# Patient Record
(demographics unavailable — no encounter records)

---

## 2025-02-21 NOTE — HISTORY OF PRESENT ILLNESS
[de-identified] : 44-year-old male presents for evaluation of chronic left knee pain. Denies recent trauma or injury, reports mis stepping while working on a roof 5 months ago which caused the pain to worsen. He complains of intermittent pain in the left knee worse with prolonged walking and bending. He has tried topical pain relievers with some relief. He localized the pain to the anterior aspect and posterior lateral aspect of the knee. He notes locking and swelling as well. Denies prior left knee injuries. Of note he has a history of right knee PLM in 2014.  The patient's past medical history, past surgical history, medications and allergies were reviewed by me today with the patient and documented accordingly. In addition, the patient's family and social history, which were noncontributory to this visit were reviewed also.

## 2025-02-21 NOTE — PHYSICAL EXAM
[de-identified] : Left knee exam  Skin: Clean, dry, intact Inspection: No obvious malalignment, +ganglion cyst over the LJL, no swelling, no effusion Pulses: 2+ DP/PT pulses ROM: 0-135 degrees of flexion. + pain with deep knee flexion/extension. Tenderness: No MJLT. + LJLT. No pain over the patella facets. No pain to the quadriceps tendon. No pain to the patella tendon. No posterior knee tenderness. Stability: Stable to varus, valgus. Negative lachman testing. Negative anterior drawer, negative posterior drawer. Strength: 5/5 Q/H/TA/GS/EHL, without atrophy Neuro: In tact to light touch throughout, DTR's normal Additional tests: Negative McMurrays test, Negative patellar grind test.  [de-identified] : The following radiographs were ordered and read by me during this patients visit. I reviewed each radiograph in detail with the patient and discussed the findings as highlighted below.   4 views of the left knee were obtained, 02/20/2025, that show no acute fracture or dislocation. There is mild medial, mild lateral and moderate patellofemoral degenerative changes seen. There is no significant malalignment. Patella angus

## 2025-02-21 NOTE — DISCUSSION/SUMMARY
[de-identified] : 43 y/o male with left knee pain.  Patient presents for evaluation of left knee pain. The patient's symptoms are consistent with possible meniscal pathology through the lateral compartment of the knee with pain with deep flexion as well as joint line tenderness with a cyst over the joint line. Patient reports mechanical symptoms of swelling and locking as well as that the pain feels similar to the right knee meniscus tear. Discussed with the patient in detail the concern for underlying internal derangement to the meniscus and possible treatment options that may include conservative management (e.g. RICE, activity modification, PT, injection therapy) versus operative arthroscopy. Discussed that treatment would likely depend on the nature of the injury, quality of the chondral surfaces (degree of arthrosis) as seen on MRI imaging.   Recommendation: Rest, ice, compression, elevation (RICE) and OTC NSAID's as instructed until MRI imaging.   Follow up after MRI.

## 2025-02-21 NOTE — ADDENDUM
[FreeTextEntry1] : This note was written by Cynthia Mahajan on 02/20/2025 acting solely as a scribe for Dr. Aric Tatum.   All medical record entries made by the Scribe were at my, Dr. Aric Tatum, direction and personally dictated by me on 02/20/2025. I have personally reviewed the chart and agree that the record accurately reflects my personal performance of the history, physical exam, assessment and plan.

## 2025-02-21 NOTE — HISTORY OF PRESENT ILLNESS
[de-identified] : 44-year-old male presents for evaluation of chronic left knee pain. Denies recent trauma or injury, reports mis stepping while working on a roof 5 months ago which caused the pain to worsen. He complains of intermittent pain in the left knee worse with prolonged walking and bending. He has tried topical pain relievers with some relief. He localized the pain to the anterior aspect and posterior lateral aspect of the knee. He notes locking and swelling as well. Denies prior left knee injuries. Of note he has a history of right knee PLM in 2014.  The patient's past medical history, past surgical history, medications and allergies were reviewed by me today with the patient and documented accordingly. In addition, the patient's family and social history, which were noncontributory to this visit were reviewed also.

## 2025-02-21 NOTE — DISCUSSION/SUMMARY
[de-identified] : 45 y/o male with left knee pain.  Patient presents for evaluation of left knee pain. The patient's symptoms are consistent with possible meniscal pathology through the lateral compartment of the knee with pain with deep flexion as well as joint line tenderness with a cyst over the joint line. Patient reports mechanical symptoms of swelling and locking as well as that the pain feels similar to the right knee meniscus tear. Discussed with the patient in detail the concern for underlying internal derangement to the meniscus and possible treatment options that may include conservative management (e.g. RICE, activity modification, PT, injection therapy) versus operative arthroscopy. Discussed that treatment would likely depend on the nature of the injury, quality of the chondral surfaces (degree of arthrosis) as seen on MRI imaging.   Recommendation: Rest, ice, compression, elevation (RICE) and OTC NSAID's as instructed until MRI imaging.   Follow up after MRI.

## 2025-02-21 NOTE — PHYSICAL EXAM
[de-identified] : Left knee exam  Skin: Clean, dry, intact Inspection: No obvious malalignment, +ganglion cyst over the LJL, no swelling, no effusion Pulses: 2+ DP/PT pulses ROM: 0-135 degrees of flexion. + pain with deep knee flexion/extension. Tenderness: No MJLT. + LJLT. No pain over the patella facets. No pain to the quadriceps tendon. No pain to the patella tendon. No posterior knee tenderness. Stability: Stable to varus, valgus. Negative lachman testing. Negative anterior drawer, negative posterior drawer. Strength: 5/5 Q/H/TA/GS/EHL, without atrophy Neuro: In tact to light touch throughout, DTR's normal Additional tests: Negative McMurrays test, Negative patellar grind test.  [de-identified] : The following radiographs were ordered and read by me during this patients visit. I reviewed each radiograph in detail with the patient and discussed the findings as highlighted below.   4 views of the left knee were obtained, 02/20/2025, that show no acute fracture or dislocation. There is mild medial, mild lateral and moderate patellofemoral degenerative changes seen. There is no significant malalignment. Patella angus